# Patient Record
Sex: MALE | Race: BLACK OR AFRICAN AMERICAN | NOT HISPANIC OR LATINO | URBAN - METROPOLITAN AREA
[De-identification: names, ages, dates, MRNs, and addresses within clinical notes are randomized per-mention and may not be internally consistent; named-entity substitution may affect disease eponyms.]

---

## 2019-10-15 ENCOUNTER — EMERGENCY (EMERGENCY)
Facility: HOSPITAL | Age: 39
LOS: 0 days | Discharge: HOME | End: 2019-10-15
Attending: EMERGENCY MEDICINE
Payer: MEDICAID

## 2019-10-15 VITALS
TEMPERATURE: 98 F | HEART RATE: 80 BPM | OXYGEN SATURATION: 100 % | WEIGHT: 149.91 LBS | HEIGHT: 67 IN | SYSTOLIC BLOOD PRESSURE: 126 MMHG | RESPIRATION RATE: 16 BRPM | DIASTOLIC BLOOD PRESSURE: 85 MMHG

## 2019-10-15 DIAGNOSIS — K62.89 OTHER SPECIFIED DISEASES OF ANUS AND RECTUM: ICD-10-CM

## 2019-10-15 DIAGNOSIS — K64.9 UNSPECIFIED HEMORRHOIDS: ICD-10-CM

## 2019-10-15 PROCEDURE — 99283 EMERGENCY DEPT VISIT LOW MDM: CPT

## 2019-10-15 RX ORDER — IBUPROFEN 200 MG
1 TABLET ORAL
Qty: 20 | Refills: 0
Start: 2019-10-15 | End: 2019-10-19

## 2019-10-15 RX ORDER — MINERAL OIL, PETROLATUM, PHENYLEPHRINE HCL 2.5; 140; 749 MG/G; MG/G; MG/G
1 OINTMENT TOPICAL
Qty: 1 | Refills: 0
Start: 2019-10-15 | End: 2019-10-21

## 2019-10-15 RX ORDER — SENNOSIDES/DOCUSATE SODIUM 8.6MG-50MG
2 TABLET ORAL
Qty: 60 | Refills: 0
Start: 2019-10-15 | End: 2019-11-13

## 2019-10-15 RX ORDER — OXYCODONE AND ACETAMINOPHEN 5; 325 MG/1; MG/1
1 TABLET ORAL ONCE
Refills: 0 | Status: DISCONTINUED | OUTPATIENT
Start: 2019-10-15 | End: 2019-10-15

## 2019-10-15 RX ADMIN — OXYCODONE AND ACETAMINOPHEN 1 TABLET(S): 5; 325 TABLET ORAL at 05:28

## 2019-10-15 RX ADMIN — OXYCODONE AND ACETAMINOPHEN 1 TABLET(S): 5; 325 TABLET ORAL at 05:50

## 2019-10-15 NOTE — ED PROVIDER NOTE - PATIENT PORTAL LINK FT
You can access the FollowMyHealth Patient Portal offered by [facility] by registering at the following website: [email or web address]. By joining [facility]’s FollowMyHealth portal, you will also be able to view your health information using other applications (apps) compatible with our system.

## 2019-10-15 NOTE — ED PROVIDER NOTE - OBJECTIVE STATEMENT
Pertinent PMH/PSH/FHx/SHx and Review of Systems contained within:  Patient presents to the ED for rectal pain.  Patient says that he was on the commode yesterday straining to have a bowel movement and developed rectal pain.  Denies fevers, drainage, abdominal pain, high risk sexual behavior.  Has pain on walking, sitting.  Has history of hemorrhoid one other time, was treated in [facility].      Relevant PMHx/SHx/SOCHx/FAMH:  Hemorrhoids, denies psh  Patient denies EtOH/tobacco/illicit substance use.    ROS: No fever/chills, No headache/photophobia/eye pain/changes in vision, No ear pain/sore throat/dysphagia, No chest pain/palpitations, no SOB/cough/wheeze/stridor, No abdominal pain, No N/V/D/melena, no dysuria/frequency/discharge, No neck/back pain, no rash, no changes in neurological status/function.

## 2019-10-15 NOTE — ED PROVIDER NOTE - PHYSICAL EXAMINATION
Gen: Alert, distress on movement, well appearing  Head: NC, AT, normal lids/conjunctiva  ENT: normal hearing, normal nares  Neck: +supple, +Trachea midline  Pulm: Bilateral BS, normal resp effort, no wheeze/stridor/retractions  CV: RRR, no M/R/G, +dist pulses  Abd: soft, NT/ND, no palpable masses  Rectal: 1 nonbleeding nonthrombosed small tender hemorrhoid at 11oclock, no visible abscess, erythema or fluctuance  Mskel: no edema/erythema/cyanosis  Skin: no rash, warm/dry  Neuro: AAOx3, no apparent sensory/motor deficits, coordination intact

## 2019-10-15 NOTE — ED PROVIDER NOTE - CLINICAL SUMMARY MEDICAL DECISION MAKING FREE TEXT BOX
Well appearing patient with rectal pain determined to be from hemorrhoid.  VSS.  Will treat with stool softener, anal cream, Sitz bath, colorectal follow up.  Discussed results and outcome of today's visit with the patient.  Patient advised to please follow up with another healthcare provider within the next 24 hours and return to the Emergency Department for worsening symptoms or any other concerns.  Patient advised that their doctor may call  to follow up on the specific results of the tests performed today in the emergency department.   Patient appears well on discharge.